# Patient Record
Sex: FEMALE | Race: WHITE | NOT HISPANIC OR LATINO | Employment: UNEMPLOYED | URBAN - METROPOLITAN AREA
[De-identification: names, ages, dates, MRNs, and addresses within clinical notes are randomized per-mention and may not be internally consistent; named-entity substitution may affect disease eponyms.]

---

## 2021-02-01 LAB
ABORH_EXT (HISTORICAL CONVERSION): NORMAL
ANTIBODY_EXT (HISTORICAL CONVERSION): NEGATIVE
HBSAG_EXT (HISTORICAL CONVERSION): NORMAL
HGB_EXT (HISTORICAL CONVERSION): 11.5
HIV_EXT: NORMAL
PLT_EXT - HISTORICAL: 235
RPR - HISTORICAL: NORMAL
RUBELLA_EXT (HISTORICAL CONVERSION): NORMAL

## 2021-06-09 ENCOUNTER — HOSPITAL ENCOUNTER (OUTPATIENT)
Dept: OBGYN | Facility: HOSPITAL | Age: 26
Discharge: HOME | End: 2021-06-09
Attending: OBSTETRICS & GYNECOLOGY | Admitting: OBSTETRICS & GYNECOLOGY
Payer: MEDICAID

## 2021-06-09 DIAGNOSIS — B96.89 BACTERIAL VAGINOSIS IN PREGNANCY: ICD-10-CM

## 2021-06-09 DIAGNOSIS — O23.599 BACTERIAL VAGINOSIS IN PREGNANCY: ICD-10-CM

## 2021-06-09 LAB
ALBUMIN UR-MCNC: ABNORMAL G/DL
APPEARANCE UR: CLEAR
BACTERIA #/AREA URNS HPF: ABNORMAL /[HPF]
BILIRUB UR QL STRIP: NEGATIVE
CLUE CELLS: ABNORMAL
COLOR UR AUTO: YELLOW
GLUCOSE UR STRIP-MCNC: NEGATIVE MG/DL
HGB UR QL STRIP: NEGATIVE
HYALINE CASTS: 1 LPF
KETONES UR STRIP-MCNC: ABNORMAL MG/DL
LEUKOCYTE ESTERASE UR QL STRIP: ABNORMAL
MUCOUS THREADS #/AREA URNS LPF: PRESENT LPF
NITRATE UR QL: NEGATIVE
PH UR STRIP: 6.5 [PH] (ref 5–8)
RBC URINE: 1 HPF
SP GR UR STRIP: 1.02 (ref 1–1.03)
SQUAMOUS EPITHELIAL: 2 /HPF
TRICHOMONAS, WET PREP: ABNORMAL
UROBILINOGEN UR STRIP-ACNC: ABNORMAL
WBC URINE: 3 HPF
YEAST, WET PREP: ABNORMAL

## 2021-06-10 LAB
ALLERGIC TO PENICILLIN: NO
BACTERIA SPEC CULT: NORMAL
GP B STREP DNA SPEC QL NAA+PROBE: NEGATIVE

## 2021-06-12 ENCOUNTER — HOSPITAL ENCOUNTER (OUTPATIENT)
Dept: OBGYN | Facility: HOSPITAL | Age: 26
Discharge: HOME | End: 2021-06-13
Attending: OBSTETRICS & GYNECOLOGY | Admitting: OBSTETRICS & GYNECOLOGY
Payer: MEDICAID

## 2021-06-12 DIAGNOSIS — B96.89 BACTERIAL VAGINOSIS: ICD-10-CM

## 2021-06-12 DIAGNOSIS — N76.0 BACTERIAL VAGINOSIS: ICD-10-CM

## 2021-06-12 LAB — RUPTURE OF FETAL MEMBRANES BY ROM PLUS: NEGATIVE

## 2021-06-12 ASSESSMENT — MIFFLIN-ST. JEOR: SCORE: 1906.63

## 2021-06-26 NOTE — PROGRESS NOTES
Patient arrived via wheel chair from ER. States has been feeling an increase in vaginal pressure that comes and goes for the past few weeks which has increased since yesterday evening. Denies any intercourse, or straining. Feels she has lost her mucous plug which was a white/green color. +fetal movements, category 1 tracing. No Hx of  delivery, 2 term vaginal unmedicated births. Patient also states she feels dizzy when standing for too long and then sits down which relieves it.    1545: Dr Scott updated on patient status. POC for SVE and labs to be collected. Call OB when results are returned and if unable to reach OB then contact the in house with the results and anticipated POC. Discussed POC with patient, patient verbalized understanding, questions  And concerns answered. SVE performed, fingertip, posterior, thick and high to reach presenting part. Labs collected, awaiting results.

## 2021-06-26 NOTE — PROGRESS NOTES
Triage Note    26 yo  at 35 weeks presented to L+D with vaginal discharge and cramping. She was seen on  and given a prescription for BV. The prescription was lost so she has not taken the medication. She feels a contractions 4-5x per day. No PIH symptoms or vaginal bleeding.     Temp:  [98.9  F (37.2  C)] 98.9  F (37.2  C)  Heart Rate:  [92] 92  Resp:  [18] 18  BP: (127)/(65) 127/65    NAD, AAO x 3  Abdomen soft, non tender  Cervix: Closed    Wet prep: Clue cells    FHTs: Cat 1  [place]: irregular    A/P: 26 yo  at 35 wks with BV  -- Will send a script for flagyl  -- OK to discharge home    Florencia Chacko MD  (P) [phone]

## 2021-06-26 NOTE — PROGRESS NOTES
Pt informed about the negative result   Dr EDUARDO Chacko went in and evaluated  pt. Cervix closed. Dr Chacko ordered to discharge patient home. Prescription electronically sent to [facility].  Pt given written discharge instructions and pt verbalized understanding and walked out of the unit.

## 2021-06-26 NOTE — PROGRESS NOTES
Dr Ornelas in house OB informed of patient status, including late deceration noted since talking to Dr Leal. Since the deceleration patient has had category 1 tracing with accels noted. OB reviewed chart and tracing. POC discharge home with prescription for flagyl and keep next scheduled appointment with Dr Leal. If patient still on unit with eating may have 1st dose while in hospital then fill prescription to begin in the  Morning otherwise encourage patient to fill prescription tonight.    Discharge instructions gone through with patient, verbalized understanding, questions and concerned answered

## 2021-06-26 NOTE — PROGRESS NOTES
2253,;Pt [id number] 34 wks 6days, presents at [facility] for evaluation of possible repture of membrane and lower back pain. Pt admitted to labor room  32. Oriented to room and use of call light and emergency call lights.  EFM/TOCO applied, ROM obtained and sent to lab, SVE finger tip and posterior.Vital signs taken and documented. Awaiting the lab result.

## 2021-06-28 ENCOUNTER — RECORDS - HEALTHEAST (OUTPATIENT)
Dept: ADMINISTRATIVE | Facility: OTHER | Age: 26
End: 2021-06-28

## 2021-06-28 ENCOUNTER — COMMUNICATION - HEALTHEAST (OUTPATIENT)
Dept: SCHEDULING | Facility: CLINIC | Age: 26
End: 2021-06-28

## 2021-07-06 VITALS — WEIGHT: 264 LBS | BODY MASS INDEX: 46.78 KG/M2 | HEIGHT: 63 IN

## 2021-07-14 PROBLEM — Z34.90 PREGNANT: Status: RESOLVED | Noted: 2021-06-28 | Resolved: 2021-06-29
